# Patient Record
Sex: MALE | Race: WHITE | Employment: STUDENT | URBAN - METROPOLITAN AREA
[De-identification: names, ages, dates, MRNs, and addresses within clinical notes are randomized per-mention and may not be internally consistent; named-entity substitution may affect disease eponyms.]

---

## 2019-10-06 ENCOUNTER — HOSPITAL ENCOUNTER (EMERGENCY)
Facility: HOSPITAL | Age: 19
Discharge: HOME | End: 2019-10-06
Attending: PEDIATRICS
Payer: COMMERCIAL

## 2019-10-06 VITALS
HEART RATE: 61 BPM | WEIGHT: 194.88 LBS | BODY MASS INDEX: 29 KG/M2 | OXYGEN SATURATION: 100 % | DIASTOLIC BLOOD PRESSURE: 85 MMHG | RESPIRATION RATE: 18 BRPM | TEMPERATURE: 98 F | SYSTOLIC BLOOD PRESSURE: 133 MMHG

## 2019-10-06 DIAGNOSIS — I88.9 CERVICAL LYMPHADENITIS: Primary | ICD-10-CM

## 2019-10-06 PROCEDURE — 99283 EMERGENCY DEPT VISIT LOW MDM: CPT

## 2019-10-06 RX ORDER — AZITHROMYCIN 250 MG/1
TABLET, FILM COATED ORAL
Qty: 1 PACKAGE | Refills: 0 | Status: SHIPPED | OUTPATIENT
Start: 2019-10-06 | End: 2020-01-22 | Stop reason: ALTCHOICE

## 2019-10-06 NOTE — ED PROVIDER NOTES
Patient Seen in: [facility] Emergency Department      History   Patient presents with:  Head Neck Injury (neurologic, musculoskeletal)    Stated Complaint: L sided neck pain    HPI    68-year-old male here with bilateral left neck pain.   He complain Left eye exhibits no discharge. No scleral icterus. Neck: Normal range of motion. Neck supple. No JVD present. No tracheal deviation present. No thyromegaly present.    Bilateral tender cervical and submandibular lymphadenopathy, left posterior auricular starting antibiotics. Would prefer Augmentin however given allergy prescribed azithromycin. If no improvement in 2 to 3 days, return to the ER.     I have considered other serious etiologies for this patient's complaints, however the presentation is not c

## 2019-10-06 NOTE — ED INITIAL ASSESSMENT (HPI)
Reports woke up with L sided neck pain. Pain to bilateral sides of neck, but worse with restriction of movement to L side. Reports unknown injury. States did play football yesterday and noted slightly sore but no known injury/impact. No loc or vomiting.

## 2020-08-13 ENCOUNTER — LAB REQUISITION (OUTPATIENT)
Dept: ADMINISTRATIVE | Age: 20
End: 2020-08-13
Payer: COMMERCIAL

## 2020-08-13 DIAGNOSIS — Z20.822 ENCOUNTER FOR SCREENING LABORATORY TESTING FOR COVID-19 VIRUS: ICD-10-CM

## 2020-08-15 LAB — SARS-COV-2 RNA RESP QL NAA+PROBE: NOT DETECTED

## 2020-08-22 ENCOUNTER — LAB REQUISITION (OUTPATIENT)
Age: 20
End: 2020-08-22
Payer: COMMERCIAL

## 2020-08-22 DIAGNOSIS — Z20.822 ENCOUNTER FOR SCREENING LABORATORY TESTING FOR COVID-19 VIRUS: ICD-10-CM

## 2020-08-24 LAB — SARS-COV-2 BY PCR: NOT DETECTED

## 2020-09-05 ENCOUNTER — LAB REQUISITION (OUTPATIENT)
Age: 20
End: 2020-09-05
Payer: COMMERCIAL

## 2020-09-05 DIAGNOSIS — Z20.822 ENCOUNTER FOR SCREENING LABORATORY TESTING FOR COVID-19 VIRUS: ICD-10-CM

## 2020-09-07 LAB — SARS-COV-2 BY PCR: NOT DETECTED

## 2020-10-03 ENCOUNTER — LAB REQUISITION (OUTPATIENT)
Age: 20
End: 2020-10-03
Payer: COMMERCIAL

## 2020-10-03 DIAGNOSIS — Z20.828 CONTACT WITH OR EXPOSURE TO VIRAL DISEASE: ICD-10-CM

## 2020-10-31 ENCOUNTER — LAB REQUISITION (OUTPATIENT)
Age: 20
End: 2020-10-31
Payer: COMMERCIAL

## 2020-10-31 DIAGNOSIS — Z20.828 CONTACT WITH OR EXPOSURE TO VIRAL DISEASE: ICD-10-CM

## 2020-11-14 ENCOUNTER — LAB REQUISITION (OUTPATIENT)
Age: 20
End: 2020-11-14
Payer: COMMERCIAL

## 2020-11-14 DIAGNOSIS — Z20.828 CONTACT WITH OR EXPOSURE TO VIRAL DISEASE: ICD-10-CM

## 2021-05-09 ENCOUNTER — HOSPITAL ENCOUNTER (EMERGENCY)
Facility: HOSPITAL | Age: 21
Discharge: HOME | End: 2021-05-10
Attending: EMERGENCY MEDICINE
Payer: COMMERCIAL

## 2021-05-09 DIAGNOSIS — T78.3XXA ANGIOEDEMA, INITIAL ENCOUNTER: Primary | ICD-10-CM

## 2021-05-09 DIAGNOSIS — T78.2XXA ANAPHYLAXIS, INITIAL ENCOUNTER: ICD-10-CM

## 2021-05-09 PROCEDURE — 96375 TX/PRO/DX INJ NEW DRUG ADDON: CPT

## 2021-05-09 PROCEDURE — 96374 THER/PROPH/DIAG INJ IV PUSH: CPT

## 2021-05-09 PROCEDURE — 99284 EMERGENCY DEPT VISIT MOD MDM: CPT

## 2021-05-09 PROCEDURE — 96372 THER/PROPH/DIAG INJ SC/IM: CPT

## 2021-05-09 PROCEDURE — 96361 HYDRATE IV INFUSION ADD-ON: CPT

## 2021-05-09 PROCEDURE — S0028 INJECTION, FAMOTIDINE, 20 MG: HCPCS

## 2021-05-09 RX ORDER — METHYLPREDNISOLONE SODIUM SUCCINATE 125 MG/2ML
125 INJECTION, POWDER, LYOPHILIZED, FOR SOLUTION INTRAMUSCULAR; INTRAVENOUS ONCE
Status: COMPLETED | OUTPATIENT
Start: 2021-05-09 | End: 2021-05-09

## 2021-05-09 RX ORDER — DIPHENHYDRAMINE HYDROCHLORIDE 50 MG/ML
INJECTION INTRAMUSCULAR; INTRAVENOUS
Status: COMPLETED
Start: 2021-05-09 | End: 2021-05-09

## 2021-05-09 RX ORDER — METHYLPREDNISOLONE SODIUM SUCCINATE 125 MG/2ML
INJECTION, POWDER, LYOPHILIZED, FOR SOLUTION INTRAMUSCULAR; INTRAVENOUS
Status: COMPLETED
Start: 2021-05-09 | End: 2021-05-09

## 2021-05-09 RX ORDER — FAMOTIDINE 10 MG/ML
20 INJECTION, SOLUTION INTRAVENOUS ONCE
Status: COMPLETED | OUTPATIENT
Start: 2021-05-09 | End: 2021-05-09

## 2021-05-09 RX ORDER — EPINEPHRINE 0.3 MG/.3ML
0.3 INJECTION SUBCUTANEOUS
Qty: 1 EACH | Refills: 0 | Status: SHIPPED | OUTPATIENT
Start: 2021-05-09 | End: 2021-06-08

## 2021-05-09 RX ORDER — DIPHENHYDRAMINE HYDROCHLORIDE 50 MG/ML
25 INJECTION INTRAMUSCULAR; INTRAVENOUS ONCE
Status: COMPLETED | OUTPATIENT
Start: 2021-05-09 | End: 2021-05-09

## 2021-05-09 RX ORDER — FAMOTIDINE 10 MG/ML
INJECTION, SOLUTION INTRAVENOUS
Status: COMPLETED
Start: 2021-05-09 | End: 2021-05-09

## 2021-05-10 VITALS
WEIGHT: 205 LBS | OXYGEN SATURATION: 99 % | RESPIRATION RATE: 26 BRPM | SYSTOLIC BLOOD PRESSURE: 138 MMHG | HEIGHT: 69 IN | TEMPERATURE: 98 F | BODY MASS INDEX: 30.36 KG/M2 | HEART RATE: 74 BPM | DIASTOLIC BLOOD PRESSURE: 70 MMHG

## 2021-05-10 NOTE — ED INITIAL ASSESSMENT (HPI)
Pt works in a [employer] and has been having an allergic reaction (hives) starting yesterday at 0800. Lip swelling. Improved with benadryl. Symptoms improved but hives remained. Woke with swelling. Went to urgent care and received a steroid.  To ED when

## 2021-05-10 NOTE — ED PROVIDER NOTES
Patient Seen in: [facility] Emergency Department      History   Patient presents with: Allergic Rxn Allergies    Stated Complaint: allergic reaction.      HPI/Subjective:   HPI    24-year-old with a history of ADHD presents for evaluation of suspect distress. HEENT: Sclera are not icteric. Conjunctivae within normal limits. Phonation is normal.  Posterior pharynx is normal no edema. Cardiovascular: Regular rate and rhythm  Respiratory: No cough no conversational dyspnea.    Abdomen: Soft, nontende separately reportable procedures: None.                        Disposition and Plan     Clinical Impression:  Angioedema, initial encounter  (primary encounter diagnosis)  Anaphylaxis, initial encounter     Disposition:  Discharge  5/10/2021 12:34 am    [facility]

## 2022-08-09 ENCOUNTER — LAB ENCOUNTER (OUTPATIENT)
Dept: LAB | Age: 22
End: 2022-08-09
Payer: COMMERCIAL

## 2022-08-09 DIAGNOSIS — Z11.3 SCREENING EXAMINATION FOR VENEREAL DISEASE: Primary | ICD-10-CM

## 2022-08-09 PROCEDURE — 87491 CHLMYD TRACH DNA AMP PROBE: CPT

## 2022-08-10 LAB — C TRACH DNA SPEC QL NAA+PROBE: NEGATIVE

## (undated) NOTE — ED AVS SNAPSHOT
Kalyani Aponte   MRN: [id number]    Department:  [facility] Emergency Department   Date of Visit:  10/6/2019           Disclosure     Insurance plans vary and the physician(s) referred by the ER may not be covered by your plan.  Please contact yo tell this physician (or your personal doctor if your instructions are to return to your personal doctor) about any new or lasting problems. The primary care or specialist physician will see patients referred from the [facility] Emergency Department.  Kendrick Choi